# Patient Record
Sex: FEMALE | Race: BLACK OR AFRICAN AMERICAN | NOT HISPANIC OR LATINO | Employment: FULL TIME | URBAN - METROPOLITAN AREA
[De-identification: names, ages, dates, MRNs, and addresses within clinical notes are randomized per-mention and may not be internally consistent; named-entity substitution may affect disease eponyms.]

---

## 2018-10-30 ENCOUNTER — APPOINTMENT (OUTPATIENT)
Dept: RADIOLOGY | Facility: MEDICAL CENTER | Age: 33
End: 2018-10-30
Attending: EMERGENCY MEDICINE
Payer: COMMERCIAL

## 2018-10-30 ENCOUNTER — HOSPITAL ENCOUNTER (EMERGENCY)
Facility: MEDICAL CENTER | Age: 33
End: 2018-10-30
Attending: EMERGENCY MEDICINE
Payer: COMMERCIAL

## 2018-10-30 VITALS
DIASTOLIC BLOOD PRESSURE: 85 MMHG | BODY MASS INDEX: 36.8 KG/M2 | OXYGEN SATURATION: 96 % | TEMPERATURE: 97.2 F | HEIGHT: 63 IN | WEIGHT: 207.67 LBS | SYSTOLIC BLOOD PRESSURE: 140 MMHG | RESPIRATION RATE: 18 BRPM | HEART RATE: 86 BPM

## 2018-10-30 DIAGNOSIS — N83.202 CYST OF LEFT OVARY: ICD-10-CM

## 2018-10-30 DIAGNOSIS — N92.1 METRORRHAGIA: ICD-10-CM

## 2018-10-30 LAB
ALBUMIN SERPL BCP-MCNC: 4.5 G/DL (ref 3.2–4.9)
ALBUMIN/GLOB SERPL: 1.1 G/DL
ALP SERPL-CCNC: 88 U/L (ref 30–99)
ALT SERPL-CCNC: 23 U/L (ref 2–50)
ANION GAP SERPL CALC-SCNC: 13 MMOL/L (ref 0–11.9)
APPEARANCE UR: ABNORMAL
AST SERPL-CCNC: 26 U/L (ref 12–45)
BACTERIA #/AREA URNS HPF: ABNORMAL /HPF
BASOPHILS # BLD AUTO: 0.4 % (ref 0–1.8)
BASOPHILS # BLD: 0.03 K/UL (ref 0–0.12)
BILIRUB SERPL-MCNC: 0.6 MG/DL (ref 0.1–1.5)
BILIRUB UR QL STRIP.AUTO: NEGATIVE
BUN SERPL-MCNC: 12 MG/DL (ref 8–22)
CALCIUM SERPL-MCNC: 10.1 MG/DL (ref 8.5–10.5)
CHLORIDE SERPL-SCNC: 104 MMOL/L (ref 96–112)
CO2 SERPL-SCNC: 24 MMOL/L (ref 20–33)
COLOR UR: YELLOW
CREAT SERPL-MCNC: 0.86 MG/DL (ref 0.5–1.4)
EOSINOPHIL # BLD AUTO: 0.03 K/UL (ref 0–0.51)
EOSINOPHIL NFR BLD: 0.4 % (ref 0–6.9)
EPI CELLS #/AREA URNS HPF: ABNORMAL /HPF
ERYTHROCYTE [DISTWIDTH] IN BLOOD BY AUTOMATED COUNT: 45.6 FL (ref 35.9–50)
GLOBULIN SER CALC-MCNC: 4.2 G/DL (ref 1.9–3.5)
GLUCOSE SERPL-MCNC: 86 MG/DL (ref 65–99)
GLUCOSE UR STRIP.AUTO-MCNC: NEGATIVE MG/DL
HCG SERPL QL: NEGATIVE
HCT VFR BLD AUTO: 40.8 % (ref 37–47)
HGB BLD-MCNC: 13.9 G/DL (ref 12–16)
HYALINE CASTS #/AREA URNS LPF: ABNORMAL /LPF
IMM GRANULOCYTES # BLD AUTO: 0.02 K/UL (ref 0–0.11)
IMM GRANULOCYTES NFR BLD AUTO: 0.3 % (ref 0–0.9)
KETONES UR STRIP.AUTO-MCNC: 40 MG/DL
LEUKOCYTE ESTERASE UR QL STRIP.AUTO: NEGATIVE
LIPASE SERPL-CCNC: 9 U/L (ref 11–82)
LYMPHOCYTES # BLD AUTO: 2.21 K/UL (ref 1–4.8)
LYMPHOCYTES NFR BLD: 30.7 % (ref 22–41)
MCH RBC QN AUTO: 31.2 PG (ref 27–33)
MCHC RBC AUTO-ENTMCNC: 34.1 G/DL (ref 33.6–35)
MCV RBC AUTO: 91.7 FL (ref 81.4–97.8)
MICRO URNS: ABNORMAL
MONOCYTES # BLD AUTO: 0.39 K/UL (ref 0–0.85)
MONOCYTES NFR BLD AUTO: 5.4 % (ref 0–13.4)
NEUTROPHILS # BLD AUTO: 4.51 K/UL (ref 2–7.15)
NEUTROPHILS NFR BLD: 62.8 % (ref 44–72)
NITRITE UR QL STRIP.AUTO: POSITIVE
NRBC # BLD AUTO: 0 K/UL
NRBC BLD-RTO: 0 /100 WBC
PH UR STRIP.AUTO: 5 [PH]
PLATELET # BLD AUTO: 277 K/UL (ref 164–446)
PMV BLD AUTO: 9.4 FL (ref 9–12.9)
POTASSIUM SERPL-SCNC: 4.2 MMOL/L (ref 3.6–5.5)
PROT SERPL-MCNC: 8.7 G/DL (ref 6–8.2)
PROT UR QL STRIP: NEGATIVE MG/DL
RBC # BLD AUTO: 4.45 M/UL (ref 4.2–5.4)
RBC # URNS HPF: ABNORMAL /HPF
RBC UR QL AUTO: ABNORMAL
RENAL EPI CELLS #/AREA URNS HPF: ABNORMAL /HPF
SODIUM SERPL-SCNC: 141 MMOL/L (ref 135–145)
SP GR UR STRIP.AUTO: 1.02
UROBILINOGEN UR STRIP.AUTO-MCNC: 0.2 MG/DL
WBC # BLD AUTO: 7.2 K/UL (ref 4.8–10.8)
WBC #/AREA URNS HPF: ABNORMAL /HPF

## 2018-10-30 PROCEDURE — 76856 US EXAM PELVIC COMPLETE: CPT

## 2018-10-30 PROCEDURE — 700111 HCHG RX REV CODE 636 W/ 250 OVERRIDE (IP): Performed by: EMERGENCY MEDICINE

## 2018-10-30 PROCEDURE — 84703 CHORIONIC GONADOTROPIN ASSAY: CPT

## 2018-10-30 PROCEDURE — A9270 NON-COVERED ITEM OR SERVICE: HCPCS | Performed by: EMERGENCY MEDICINE

## 2018-10-30 PROCEDURE — 96375 TX/PRO/DX INJ NEW DRUG ADDON: CPT

## 2018-10-30 PROCEDURE — 81001 URINALYSIS AUTO W/SCOPE: CPT

## 2018-10-30 PROCEDURE — 85025 COMPLETE CBC W/AUTO DIFF WBC: CPT

## 2018-10-30 PROCEDURE — 700102 HCHG RX REV CODE 250 W/ 637 OVERRIDE(OP): Performed by: EMERGENCY MEDICINE

## 2018-10-30 PROCEDURE — 99285 EMERGENCY DEPT VISIT HI MDM: CPT

## 2018-10-30 PROCEDURE — 83690 ASSAY OF LIPASE: CPT

## 2018-10-30 PROCEDURE — 700105 HCHG RX REV CODE 258: Performed by: EMERGENCY MEDICINE

## 2018-10-30 PROCEDURE — 96374 THER/PROPH/DIAG INJ IV PUSH: CPT

## 2018-10-30 PROCEDURE — 80053 COMPREHEN METABOLIC PANEL: CPT

## 2018-10-30 RX ORDER — ACETAMINOPHEN 325 MG/1
650 TABLET ORAL ONCE
Status: COMPLETED | OUTPATIENT
Start: 2018-10-30 | End: 2018-10-30

## 2018-10-30 RX ORDER — SODIUM CHLORIDE 9 MG/ML
1000 INJECTION, SOLUTION INTRAVENOUS ONCE
Status: COMPLETED | OUTPATIENT
Start: 2018-10-30 | End: 2018-10-30

## 2018-10-30 RX ORDER — METOCLOPRAMIDE HYDROCHLORIDE 5 MG/ML
10 INJECTION INTRAMUSCULAR; INTRAVENOUS ONCE
Status: COMPLETED | OUTPATIENT
Start: 2018-10-30 | End: 2018-10-30

## 2018-10-30 RX ORDER — ONDANSETRON 2 MG/ML
4 INJECTION INTRAMUSCULAR; INTRAVENOUS ONCE
Status: COMPLETED | OUTPATIENT
Start: 2018-10-30 | End: 2018-10-30

## 2018-10-30 RX ADMIN — METOCLOPRAMIDE 10 MG: 5 INJECTION, SOLUTION INTRAMUSCULAR; INTRAVENOUS at 19:59

## 2018-10-30 RX ADMIN — SODIUM CHLORIDE 1000 ML: 9 INJECTION, SOLUTION INTRAVENOUS at 19:58

## 2018-10-30 RX ADMIN — ONDANSETRON 4 MG: 2 INJECTION INTRAMUSCULAR; INTRAVENOUS at 19:59

## 2018-10-30 RX ADMIN — ACETAMINOPHEN 650 MG: 325 TABLET, FILM COATED ORAL at 19:58

## 2018-10-30 ASSESSMENT — PAIN SCALES - GENERAL
PAINLEVEL_OUTOF10: 7
PAINLEVEL_OUTOF10: 7
PAINLEVEL_OUTOF10: 3

## 2018-10-31 NOTE — ED TRIAGE NOTES
Pt ambulatory to triage. Pt states she has generalized abdominal pain that started began this morning. LBM was last night. LMP 10/16. Pt denies painful urination or frequency.      Chief Complaint   Patient presents with   • Abdominal Pain     Pt placed in lobby, updated on triage process. Pt educated to notified RN or triage tech if changes in condition.

## 2018-10-31 NOTE — ED NOTES
RN went through discharge paperwork with the pt. And her significant other. The pt. Was very receptive to instructions and follow up care.

## 2018-10-31 NOTE — ED PROVIDER NOTES
ED Provider Note    Scribed for Irvin Jc M.D. by Ammon Win. 10/30/2018, 7:20 PM.    Primary care provider: None noted  Means of arrival: Walk in  History obtained from: Patient  History limited by: None    CHIEF COMPLAINT  Chief Complaint   Patient presents with   • Abdominal Pain       HPI  Esha Carson is a 33 y.o. female with history of A7, asthma, and migraines who presents to the Emergency Department for right lower quadrant pain that radiates around to her right lower back onset this morning after sexual intercourse. The patient states the sexual intercourse was not painful at that time. She endorses associated vaginal bleeding, which is reported to be as heavy as a normal menstrual period, but is concerned because she just finished her last menstrual period a few days ago. The patient also state she has developed vomiting, no hematemesis, with about 5 episodes of emesis today. She currently has a headache, but states this feels similar to her past migraines. The patient additionally has been experiencing generalized body aches. She denies diarrhea or dysuria. No alleviating or exacerbating factors are identified at this time.     REVIEW OF SYSTEMS  Pertinent positives include right lower quadrant pain, vaginal bleeding, vomiting, headache, and generalized body aches. Pertinent negatives include hematemesis, diarrhea, or dysuria. All other systems negative.    PAST MEDICAL HISTORY   A7    SURGICAL HISTORY  patient denies any surgical history    SOCIAL HISTORY  Social History   Substance Use Topics   • Smoking status: Current Every Day Smoker     Packs/day: 0.25   • Smokeless tobacco: Never Used   • Alcohol use Yes      Comment: occ      History   Drug Use No       FAMILY HISTORY  History reviewed. No pertinent family history.    CURRENT MEDICATIONS  Home Medications     Reviewed by Nishant Llanos R.N. (Registered Nurse) on 10/30/18 at 1801  Med List Status: Complete  "  Medication Last Dose Status        Patient Gaston Taking any Medications                       ALLERGIES  Allergies   Allergen Reactions   • Ibuprofen        PHYSICAL EXAM  VITAL SIGNS: /92   Pulse 95   Temp 36.2 °C (97.2 °F) (Temporal)   Resp 18   Ht 1.6 m (5' 3\")   Wt 94.2 kg (207 lb 10.8 oz)   SpO2 94%   BMI 36.79 kg/m²     Constitutional: Well developed, Well nourished, No distress.   HENT: Normocephalic, Atraumatic, Oropharynx moist.   Eyes: Conjunctiva normal, No discharge.   Neck: Supple, No stridor.  Cardiovascular: Normal heart rate, Normal rhythm, No murmurs, equal pulses.   Pulmonary: Normal breath sounds, No respiratory distress, No wheezing, No rales, No rhonchi.  Chest: No chest wall tenderness or deformity.   Abdomen:Soft, Suprapubic and right lower quadrant tenderness, No masses, no rebound, no guarding.   Pelvic: Small amount of dark blood in vaginal vault. No vaginal laceration. Bleeding appears to be coming from cervical os. Tenderness in left adnexal.   Back: Slight right sided CVA tenderness.   Musculoskeletal: No major deformities noted, No tenderness.   Skin: Warm, Dry, No erythema, No rash.   Neurologic: Alert & oriented x 3, Normal motor function,  No focal deficits noted.   Psychiatric: Affect normal, Judgment normal, Mood normal.     LABS  Results for orders placed or performed during the hospital encounter of 10/30/18   CBC WITH DIFFERENTIAL   Result Value Ref Range    WBC 7.2 4.8 - 10.8 K/uL    RBC 4.45 4.20 - 5.40 M/uL    Hemoglobin 13.9 12.0 - 16.0 g/dL    Hematocrit 40.8 37.0 - 47.0 %    MCV 91.7 81.4 - 97.8 fL    MCH 31.2 27.0 - 33.0 pg    MCHC 34.1 33.6 - 35.0 g/dL    RDW 45.6 35.9 - 50.0 fL    Platelet Count 277 164 - 446 K/uL    MPV 9.4 9.0 - 12.9 fL    Neutrophils-Polys 62.80 44.00 - 72.00 %    Lymphocytes 30.70 22.00 - 41.00 %    Monocytes 5.40 0.00 - 13.40 %    Eosinophils 0.40 0.00 - 6.90 %    Basophils 0.40 0.00 - 1.80 %    Immature Granulocytes 0.30 0.00 - 0.90 " %    Nucleated RBC 0.00 /100 WBC    Neutrophils (Absolute) 4.51 2.00 - 7.15 K/uL    Lymphs (Absolute) 2.21 1.00 - 4.80 K/uL    Monos (Absolute) 0.39 0.00 - 0.85 K/uL    Eos (Absolute) 0.03 0.00 - 0.51 K/uL    Baso (Absolute) 0.03 0.00 - 0.12 K/uL    Immature Granulocytes (abs) 0.02 0.00 - 0.11 K/uL    NRBC (Absolute) 0.00 K/uL   COMP METABOLIC PANEL   Result Value Ref Range    Sodium 141 135 - 145 mmol/L    Potassium 4.2 3.6 - 5.5 mmol/L    Chloride 104 96 - 112 mmol/L    Co2 24 20 - 33 mmol/L    Anion Gap 13.0 (H) 0.0 - 11.9    Glucose 86 65 - 99 mg/dL    Bun 12 8 - 22 mg/dL    Creatinine 0.86 0.50 - 1.40 mg/dL    Calcium 10.1 8.5 - 10.5 mg/dL    AST(SGOT) 26 12 - 45 U/L    ALT(SGPT) 23 2 - 50 U/L    Alkaline Phosphatase 88 30 - 99 U/L    Total Bilirubin 0.6 0.1 - 1.5 mg/dL    Albumin 4.5 3.2 - 4.9 g/dL    Total Protein 8.7 (H) 6.0 - 8.2 g/dL    Globulin 4.2 (H) 1.9 - 3.5 g/dL    A-G Ratio 1.1 g/dL   LIPASE   Result Value Ref Range    Lipase 9 (L) 11 - 82 U/L   HCG QUAL SERUM   Result Value Ref Range    Beta-Hcg Qualitative Serum Negative Negative   URINALYSIS,CULTURE IF INDICATED   Result Value Ref Range    Color Yellow     Character Cloudy (A)     Specific Gravity 1.022 <1.035    Ph 5.0 5.0 - 8.0    Glucose Negative Negative mg/dL    Ketones 40 (A) Negative mg/dL    Protein Negative Negative mg/dL    Bilirubin Negative Negative    Urobilinogen, Urine 0.2 Negative    Nitrite Positive (A) Negative    Leukocyte Esterase Negative Negative    Occult Blood Large (A) Negative    Micro Urine Req Microscopic    URINE MICROSCOPIC (W/UA)   Result Value Ref Range    WBC 2-5 /hpf    RBC 0-2 /hpf    Bacteria Many (A) None /hpf    Epithelial Cells Few /hpf    Epithelial Cells Renal Few /hpf    Hyaline Cast 0-2 /lpf   ESTIMATED GFR   Result Value Ref Range    GFR If African American >60 >60 mL/min/1.73 m 2    GFR If Non African American >60 >60 mL/min/1.73 m 2     All labs reviewed by me.    RADIOLOGY  US-PELVIC COMPLETE  (TRANSABDOMINAL/TRANSVAGINAL) (COMBO)   Final Result         1.  Mildly complex left ovarian cyst. Recommend follow-up pelvic sonogram in 6 weeks for reevaluation.   2.  Doppler flow is not definitively identified within the right ovary, ovarian torsion cannot be sonographically excluded. However the ovary would most commonly be enlarged in the setting of torsion.   3.  Nabothian cyst        The radiologist's interpretation of all radiological studies have been reviewed by me.    COURSE & MEDICAL DECISION MAKING  Pertinent Labs & Imaging studies reviewed. (See chart for details)    7:20 PM - Patient seen and examined at bedside. Patient will be treated with Tylenol 650 mg, Zofran 4 mg, and Reglan 10 mg. The patient will receive 1 L IV fluids for concern of dehydration given vomiting today. Ordered Chlamydia & GC, Wet Prep, Urine Microscopic, Estimated GFR, Urinalysis, HCG Qual, Lipase, CMP, and CBC with differential to evaluate her symptoms. The differential diagnoses include but are not limited to: ovarian cyst, ectopic pregnancy, UTI, vaginal laceration, gastroenteritis.      8:45 PM - Reviewed patient's lab and radiology results as shown above.     8:47 PM - Pelvic exam performed at this time in presence of female RN chaperone. Patient does not wish to have cultures ordered at this time. Cancelled Chlamydia & GC. Ordered US-Pelvic Transvaginal to further evaluate.    10:55 PM - Reviewed patient's radiology results as shown above.    10:57 PM - Patient reevaluated at bedside. She reports to be feeling better after IV fluids, thus having positive reaction. Patient updated with imaging results that indicate a mild complex ovarian cyst. She is recommended to follow up with Gynecologist. Patient will be discharged. He is understanding and agreeable.     Medical Decision Making: At this point time I think the patient most likely has vaginal bleeding from her ovarian cyst.  She did not have any pain on pelvic exam on  the right side I do not think she has ovarian torsion.  Patient has a OB/GYN I will have her follow-up with them.  Discussed that she needs a repeat ultrasound in 6 weeks.  Patient's abdomen is otherwise nontender she has not had any further vomiting here.  Patient will be discharged home.  On reexam she has no pain or tenderness in the right lower quadrant I do not think this is early appendicitis.    The patient will return for new or worsening symptoms and is stable at the time of discharge.    The patient is referred to a primary physician for blood pressure management, diabetic screening, and for all other preventative health concerns.    DISPOSITION:  Patient will be discharged home in stable condition.    FOLLOW UP:  Your doctor    Schedule an appointment as soon as possible for a visit in 1 week  To follow up your ovarian cyst      OUTPATIENT MEDICATIONS:  There are no discharge medications for this patient.       FINAL IMPRESSION  1. Metrorrhagia    2. Cyst of left ovary          Ammon ABDALLA (Dashawn), am scribing for, and in the presence of, Irvin Jc M.D.    Electronically signed by: Ammon Reddy), 10/30/2018    Irvin ABDALLA M.D. personally performed the services described in this documentation, as scribed by Ammon Win in my presence, and it is both accurate and complete.    C.    The note accurately reflects work and decisions made by me.  Irvin Jc  10/30/2018  11:45 PM

## 2018-10-31 NOTE — DISCHARGE INSTRUCTIONS
Return if you have increasing pain, heavy vaginal bleeding greater than a pad an hour, fever or pass out.

## 2019-10-02 ENCOUNTER — HOSPITAL ENCOUNTER (EMERGENCY)
Facility: MEDICAL CENTER | Age: 34
End: 2019-10-02
Attending: EMERGENCY MEDICINE
Payer: COMMERCIAL

## 2019-10-02 VITALS
TEMPERATURE: 96.8 F | BODY MASS INDEX: 35.79 KG/M2 | HEIGHT: 66 IN | HEART RATE: 103 BPM | WEIGHT: 222.66 LBS | OXYGEN SATURATION: 98 % | SYSTOLIC BLOOD PRESSURE: 139 MMHG | DIASTOLIC BLOOD PRESSURE: 104 MMHG | RESPIRATION RATE: 18 BRPM

## 2019-10-02 DIAGNOSIS — B86 SCABIES: ICD-10-CM

## 2019-10-02 DIAGNOSIS — R21 RASH: ICD-10-CM

## 2019-10-02 PROCEDURE — 99283 EMERGENCY DEPT VISIT LOW MDM: CPT

## 2019-10-02 RX ORDER — PERMETHRIN 50 MG/G
1 CREAM TOPICAL ONCE
Qty: 1 TUBE | Refills: 1 | Status: SHIPPED | OUTPATIENT
Start: 2019-10-02 | End: 2019-10-02

## 2019-10-02 ASSESSMENT — LIFESTYLE VARIABLES: DO YOU DRINK ALCOHOL: NO

## 2019-10-02 NOTE — ED TRIAGE NOTES
Chief Complaint   Patient presents with   • Rash     States people she was staying with had scabies and now complains of skin irritation.     Ambulatory to triage for above. Explained triage process, to waiting room. Asked to inform RN if questions or concerns arise.

## 2019-10-02 NOTE — ED PROVIDER NOTES
"ED Provider Note    Scribed for Braulio Greene M.D. by Mercy Mclaughlin. 10/2/2019, 1:17 PM.    Primary care provider: None noted  Means of arrival: Walk-in  History obtained from: Patient  History limited by: None    CHIEF COMPLAINT  Chief Complaint   Patient presents with   • Rash     States people she was staying with had scabies and now complains of skin irritation.       HPI  Esha Carson is a 34 y.o. female smoker who presents to the Emergency Department for complaints of rash in multiple locations. She states that she is worried about scabies due to her ex-boyfriend having it. She notes she had a lot of contact with him. She denies any prior medical history. 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 She states she smokes it, and has never shot it. Negative for fever, chills, or shortness of breath.    REVIEW OF SYSTEMS  Pertinent positives include rash.   Pertinent negatives include no fever, chills, or shortness of breath.  No fevers or chills.  GI: No nausea or vomiting      PAST MEDICAL HISTORY  No past pertinent medical history.    SURGICAL HISTORY   has a past surgical history that includes ankle orif.    SOCIAL HISTORY  Social History     Tobacco Use   • Smoking status: Current Every Day Smoker     Packs/day: 0.25   • Smokeless tobacco: Never Used   Substance Use Topics   • Alcohol use: Yes     Comment: occ   • Drug use: No      Social History     Substance and Sexual Activity   Drug Use No       FAMILY HISTORY  None pertinent    CURRENT MEDICATIONS  Home Medications     Reviewed by Irvin Gonzalez R.N. (Registered Nurse) on 10/02/19 at 1327  Med List Status: Complete   Medication Last Dose Status        Patient Gaston Taking any Medications                       ALLERGIES  Allergies   Allergen Reactions   • Ibuprofen        PHYSICAL EXAM  VITAL SIGNS: /104   Pulse (!) 103   Temp 36 °C (96.8 °F) (Temporal)   Resp 18   Ht 1.676 m (5' 6\")   Wt 101 kg (222 lb 10.6 oz)   " SpO2 98%   BMI 35.94 kg/m²   Vitals reviewed.  Constitutional: Well developed, Well nourished, No acute distress, Non-toxic appearance.   HENT: Normocephalic, Atraumatic, Bilateral external ears normal, Oropharynx moist, No oral exudates, Nose normal.   Cardiovascular: Normal heart rate, Normal rhythm, No murmurs, No rubs, No gallops.   Thorax & Lungs: Normal breath sounds, No respiratory distress, No wheezing,  Abdomen: Bowel sounds normal, Soft, No tenderness  Skin: Warm, Dry, No erythema, No rash.  Diffuse areas of excoriation, some of these are under the breasts, and on the upper extremities.  These are concerning for scabies.  No signs of embolic phenomenon.  Musculoskeletal: Good range of motion in all major joints.   Neurologic: Alert, No focal deficits noted.   Psychiatric: Affect normal      COURSE & MEDICAL DECISION MAKING  Pertinent Labs & Imaging studies reviewed. (See chart for details)    1:17 PM - Patient seen and examined at bedside. The patient presents with rash, and the differential diagnosis includes but is not limited to scabies, methamphetamine induced formication.  I informed patient that I will treat her for scabies. I informed her of plan for discharge with Elimite 5% cream. Patient understands and agrees to plan.    Patient has a diffuse rash.  Looks like a small excoriated and looks to be of various ages.  Likely it scabietic.  This could be meth induced formication.    In either case the patient was treated for scabies and given follow-up.  She is comfortable to plan.  She will return for worsening rash or other concerns she is discharged in good condition.     The patient will return for new or worsening symptoms and is stable at the time of discharge.    DISPOSITION:  Patient will be discharged home in stable condition.    FOLLOW UP:  [address] [id number] [phone]  Schedule an appointment as soon as possible for a visit in 2  days        OUTPATIENT MEDICATIONS:  New Prescriptions    PERMETHRIN (ELIMITE) 5 % CREAM    Apply 1 Tube to affected area(s) Once for 1 dose.        FINAL IMPRESSION  1. Rash    2. Scabies          Mercy ABDALLA (Scribe), am scribing for, and in the presence of, Braulio Greene M.D..    Electronically signed by: Mercy Mclaughlin (Scribe), 10/2/2019    Braulio ABDALLA M.D. personally performed the services described in this documentation, as scribed by Mercy Mclaughlin in my presence, and it is both accurate and complete. E    The note accurately reflects work and decisions made by me.  Braulio Greene  10/2/2019  3:09 PM

## 2019-10-02 NOTE — ED NOTES
Esha Nichols Brittany Carson discharged via ambulatory with steady gait.  Discharge instructions given and reviewed, patient educated to follow up with PCP, verbalized understanding.  Prescriptions given x 1.  All personal belongings in possession.  No questions at this time.

## 2019-10-02 NOTE — DISCHARGE INSTRUCTIONS
Medications as prescribed.  Follow-up with primary care.  Return for more rash or other concerns per

## 2019-10-15 ENCOUNTER — HOSPITAL ENCOUNTER (EMERGENCY)
Facility: MEDICAL CENTER | Age: 34
End: 2019-10-15
Attending: EMERGENCY MEDICINE
Payer: COMMERCIAL

## 2019-10-15 VITALS
HEART RATE: 88 BPM | OXYGEN SATURATION: 100 % | TEMPERATURE: 97.9 F | DIASTOLIC BLOOD PRESSURE: 75 MMHG | HEIGHT: 63 IN | WEIGHT: 169.97 LBS | RESPIRATION RATE: 16 BRPM | SYSTOLIC BLOOD PRESSURE: 144 MMHG | BODY MASS INDEX: 30.12 KG/M2

## 2019-10-15 DIAGNOSIS — J45.21 MILD INTERMITTENT ASTHMA WITH ACUTE EXACERBATION: ICD-10-CM

## 2019-10-15 DIAGNOSIS — J06.9 VIRAL UPPER RESPIRATORY TRACT INFECTION: ICD-10-CM

## 2019-10-15 PROCEDURE — 99283 EMERGENCY DEPT VISIT LOW MDM: CPT

## 2019-10-15 RX ORDER — PERMETHRIN 50 MG/G
CREAM TOPICAL
Qty: 1 TUBE | Refills: 1 | Status: SHIPPED | OUTPATIENT
Start: 2019-10-15

## 2019-10-15 RX ORDER — PREDNISONE 20 MG/1
40 TABLET ORAL DAILY
Qty: 10 TAB | Refills: 0 | Status: SHIPPED | OUTPATIENT
Start: 2019-10-15 | End: 2019-10-20

## 2019-10-15 RX ORDER — ALBUTEROL SULFATE 90 UG/1
2 AEROSOL, METERED RESPIRATORY (INHALATION) EVERY 6 HOURS PRN
Qty: 8.5 G | Refills: 0 | Status: SHIPPED | OUTPATIENT
Start: 2019-10-15

## 2019-10-15 NOTE — ED NOTES
Pt was ambulatory to room, changed to gown and placed on monitor.   Pt also c/o itching all over began Saturday.

## 2019-10-15 NOTE — ED NOTES
Pt discharged with s/o. To follow up with pcp as needed. Scheduling to bedside to assist pt with finding pcp.  Pt given prescription x 3 with indication

## 2019-10-15 NOTE — ED TRIAGE NOTES
Ambulatory to triage with   Chief Complaint   Patient presents with   • Asthma   States hx of asthma. Does not use prescribed inhalers, states she does not have them. States she feels like she is getting a cold. C/o slight SOB and tight breathing today at work. No apparent respiratory distress. VSS.

## 2019-10-15 NOTE — ED PROVIDER NOTES
ED Provider Note    CHIEF COMPLAINT  Chief Complaint   Patient presents with   • Asthma       HPI  Terrelllisa Magdalena Carson is a 34 y.o. female who presents with a cough and congestion.  The patient had the symptoms for a few days.  She started off with a headache and just not feeling well few days ago.  During history she has had increasing cough, congestion.  She was quite short of breath today.  She is a history of asthma, and whenever she gets a cold this does seem to exacerbate things.  She does not have a local primary doctor having just recently moved here.  Denies any earache.  Little bit of a sore throat.  No chest pain.  No belly pain.  Little bit of loose stools which is started today.  No leg pain or swelling.  No rashes.  However she is concerned about the fact that 2 people in her office have had scabies.  She would like to have some cream for this.  There is no other complaint.    PAST MEDICAL HISTORY  None    FAMILY HISTORY  History reviewed. No pertinent family history.    SOCIAL HISTORY  Social History     Tobacco Use   • Smoking status: Current Every Day Smoker     Packs/day: 0.25     Types: Cigarettes   • Smokeless tobacco: Never Used   Substance Use Topics   • Alcohol use: Not Currently     Comment: occ   • Drug use: No     Here with her fiancé    SURGICAL HISTORY  Past Surgical History:   Procedure Laterality Date   • ANKLE ORIF         CURRENT MEDICATIONS  Home Medications     Reviewed by Paulina Reyes R.N. (Registered Nurse) on 10/15/19 at 0946  Med List Status: Complete   Medication Last Dose Status        Patient Gaston Taking any Medications                       I have reviewed the nurses notes and/or the list brought with the patient.    ALLERGIES  Allergies   Allergen Reactions   • Ibuprofen        REVIEW OF SYSTEMS  See HPI for further details. Review of systems as above, otherwise all other systems are negative.     PHYSICAL EXAM  VITAL SIGNS: /97   Pulse 88   Temp 36.7  "°C (98.1 °F) (Temporal)   Resp 18   Ht 1.6 m (5' 3\")   Wt 77.1 kg (169 lb 15.6 oz)   LMP 10/07/2019   SpO2 100%   BMI 30.11 kg/m²     Constitutional: Well appearing patient in no acute distress.  Not toxic, nor ill in appearance.  Mild upper history congestion.  HENT: Mucus membranes moist.  Oropharynx is clear.  Eyes: Pupils equally round.  No scleral icterus.   Neck: Full nontender range of motion.  Lymphatic: No cervical lymphadenopathy noted.   Cardiovascular: Regular heart rate and rhythm.  No murmurs, rubs, nor gallop appreciated.   Thorax & Lungs: Chest is nontender.  Lungs are notable for the faintness of wheeze at end expiration.  Good air movement.  Abdomen: Soft, with no tenderness, rebound nor guarding.  No mass, pulsatile mass, nor hepatosplenomegaly appreciated.  Skin: No purpura nor petechia noted.  Extremities/Musculoskeletal: No sign of trauma.    Neurologic: Alert & oriented.  Strength and sensation is intact all around.  Gait is normal.  Psychiatric: Normal affect appropriate for the clinical situation.    MEDICAL RECORD  I have reviewed patient's medical record and pertinent results are listed above.    COURSE & MEDICAL DECISION MAKING  I have reviewed any medical record information, laboratory studies and radiographic results as noted above.  This is a well-appearing patient with what appears to be a viral upper respiratory infection.  Clinically there is no evidence of meningitis, pneumonia.  She is well-hydrated.  She has a history of asthma and she has the pain is a wheeze presently.  I recommend albuterol.  She points that she is been on prednisone for symptoms like this in the past.  Having a prescription for this as well.  I have asked her to follow-up with the primary care doctor, and have asked  to talk with her about setting her up as a new patient.  Patient is concerned about scabies, clinically is no evidence of this, however at this point I have gone ahead and given " her a prescription for Elimite to that she may use.  Stresses about asthma, URI, and scabies.      FINAL IMPRESSION  1. Viral upper respiratory tract infection    2. Mild intermittent asthma with acute exacerbation    3.  Exposure to scabies       This dictation was created using voice recognition software.    Electronically signed by: Edwin Rose, 10/15/2019 11:05 AM

## 2019-10-31 ENCOUNTER — HOSPITAL ENCOUNTER (EMERGENCY)
Facility: MEDICAL CENTER | Age: 34
End: 2019-10-31
Attending: EMERGENCY MEDICINE
Payer: COMMERCIAL

## 2019-10-31 VITALS
HEIGHT: 63 IN | OXYGEN SATURATION: 99 % | RESPIRATION RATE: 14 BRPM | WEIGHT: 219.8 LBS | TEMPERATURE: 98.6 F | SYSTOLIC BLOOD PRESSURE: 112 MMHG | BODY MASS INDEX: 38.95 KG/M2 | DIASTOLIC BLOOD PRESSURE: 63 MMHG | HEART RATE: 85 BPM

## 2019-10-31 DIAGNOSIS — J02.0 STREP PHARYNGITIS: ICD-10-CM

## 2019-10-31 LAB — S PYO DNA SPEC NAA+PROBE: DETECTED

## 2019-10-31 PROCEDURE — 87651 STREP A DNA AMP PROBE: CPT

## 2019-10-31 PROCEDURE — 700102 HCHG RX REV CODE 250 W/ 637 OVERRIDE(OP): Performed by: EMERGENCY MEDICINE

## 2019-10-31 PROCEDURE — 99284 EMERGENCY DEPT VISIT MOD MDM: CPT

## 2019-10-31 PROCEDURE — A9270 NON-COVERED ITEM OR SERVICE: HCPCS | Performed by: EMERGENCY MEDICINE

## 2019-10-31 RX ORDER — AMOXICILLIN 500 MG/1
500 CAPSULE ORAL ONCE
Status: COMPLETED | OUTPATIENT
Start: 2019-10-31 | End: 2019-10-31

## 2019-10-31 RX ORDER — AMOXICILLIN 500 MG/1
500 CAPSULE ORAL 3 TIMES DAILY
Qty: 30 CAP | Refills: 0 | Status: SHIPPED | OUTPATIENT
Start: 2019-10-31 | End: 2019-11-10

## 2019-10-31 RX ORDER — ACETAMINOPHEN 325 MG/1
650 TABLET ORAL ONCE
Status: COMPLETED | OUTPATIENT
Start: 2019-10-31 | End: 2019-10-31

## 2019-10-31 RX ADMIN — AMOXICILLIN 500 MG: 500 CAPSULE ORAL at 09:23

## 2019-10-31 RX ADMIN — ACETAMINOPHEN 650 MG: 325 TABLET, FILM COATED ORAL at 09:23

## 2019-10-31 ASSESSMENT — LIFESTYLE VARIABLES
HAVE YOU EVER FELT YOU SHOULD CUT DOWN ON YOUR DRINKING: NO
EVER HAD A DRINK FIRST THING IN THE MORNING TO STEADY YOUR NERVES TO GET RID OF A HANGOVER: NO
EVER FELT BAD OR GUILTY ABOUT YOUR DRINKING: NO
DO YOU DRINK ALCOHOL: YES
DOES PATIENT WANT TO STOP DRINKING: NO
CONSUMPTION TOTAL: INCOMPLETE
TOTAL SCORE: 0
TOTAL SCORE: 0
HAVE PEOPLE ANNOYED YOU BY CRITICIZING YOUR DRINKING: NO
TOTAL SCORE: 0

## 2019-10-31 NOTE — ED PROVIDER NOTES
ED Provider Note    CHIEF COMPLAINT  Chief Complaint   Patient presents with   • Sore Throat     x 3 days       HPI  Esha Magdalena Carson is a 34 y.o. female who presents to the emergency department complaining of a sore throat.  Patient had a sore throat for last 3 days.  Her with a runny nose and some congestion.  Sore throat is gradually worsening.  Hard time eating which she will drink fluids.  No difficulty breathing.  She has minimal associated cough.  She had fevers and achiness.  No abdominal pain or vomiting.  Denies any other aggravating leaving factors or associated complaints.  Has a history of asthma but no difficulty breathing    REVIEW OF SYSTEMS  See HPI for further details. All other systems are negative.    PAST MEDICAL HISTORY  History reviewed. No pertinent past medical history.    FAMILY HISTORY  No family history on file.    SOCIAL HISTORY  Social History     Socioeconomic History   • Marital status: Single     Spouse name: Not on file   • Number of children: Not on file   • Years of education: Not on file   • Highest education level: Not on file   Occupational History   • Not on file   Social Needs   • Financial resource strain: Not on file   • Food insecurity:     Worry: Not on file     Inability: Not on file   • Transportation needs:     Medical: Not on file     Non-medical: Not on file   Tobacco Use   • Smoking status: Current Every Day Smoker     Packs/day: 0.25     Types: Cigarettes   • Smokeless tobacco: Never Used   Substance and Sexual Activity   • Alcohol use: Not Currently     Comment: occ   • Drug use: No   • Sexual activity: Not on file   Lifestyle   • Physical activity:     Days per week: Not on file     Minutes per session: Not on file   • Stress: Not on file   Relationships   • Social connections:     Talks on phone: Not on file     Gets together: Not on file     Attends Orthodox service: Not on file     Active member of club or organization: Not on file     Attends  "meetings of clubs or organizations: Not on file     Relationship status: Not on file   • Intimate partner violence:     Fear of current or ex partner: Not on file     Emotionally abused: Not on file     Physically abused: Not on file     Forced sexual activity: Not on file   Other Topics Concern   • Not on file   Social History Narrative   • Not on file       SURGICAL HISTORY  Past Surgical History:   Procedure Laterality Date   • ANKLE ORIF         CURRENT MEDICATIONS  Home Medications     Reviewed by Tay Nelson R.N. (Registered Nurse) on 10/31/19 at 0836  Med List Status: Partial   Medication Last Dose Status   albuterol 108 (90 Base) MCG/ACT Aero Soln inhalation aerosol  Active   permethrin (ELIMITE) 5 % Cream  Active                ALLERGIES  Allergies   Allergen Reactions   • Ibuprofen        PHYSICAL EXAM  VITAL SIGNS: /63   Pulse 85   Temp 37 °C (98.6 °F) (Temporal)   Resp 14   Ht 1.6 m (5' 3\")   Wt 99.7 kg (219 lb 12.8 oz)   LMP 10/07/2019   SpO2 99%   BMI 38.94 kg/m²    Constitutional: Well developed, Well nourished, No acute distress, Non-toxic appearance.   HENT: Normocephalic, Atraumatic, Bilateral external ears normal, Oropharynx enlarged erythematous tonsils with exudates.  Petechia on the palate.  No asymmetric swelling to suggest abscess.  No signs of retropharyngeal abscess  Eyes: PERRL, EOMI, Conjunctiva normal, No discharge.   Neck: Normal range of motion, and to see pressure for lymphadenopathy no meningismus  Cardiovascular: Normal heart rate, Normal rhythm, No murmurs, No rubs, No gallops.   Thorax & Lungs: Normal breath sounds, No respiratory distress, No wheezing,  Abdomen: Bowel sounds normal, Soft, No tenderness,   Skin: Warm, Dry, No erythema, No rash.    Musculoskeletal: Good range of motion in all major joints.  Neurologic: Alert, No focal deficits noted.       Results for orders placed or performed during the hospital encounter of 10/31/19   Group A Strep by PCR "   Result Value Ref Range    Group A Strep by PCR DETECTED (A) Not Detected        COURSE & MEDICAL DECISION MAKING  Pertinent Labs & Imaging studies reviewed. (See chart for details)    Patient presents with sore throat and fever.  Clinically she has strep throat.  Strep swab was obtained per protocol this is positive.  We will give her a dose of antipyretic in the biotics here in the emergency department and the patient will be discharged home.  At the time of discharge she is afebrile and nontoxic.    To be discharged home with return precautions for strep throat.  She is encouraged to drink plenty fluids.  She is prescribed amoxicillin for 10 days.  She is to return for worsening sore throat cough fevers or other concerns.  Questions were answered, agreed with plan and discharged in good condition.     [address] [address]  [phone]  Schedule an appointment as soon as possible for a visit in 2 days        The patient was noted to have elevated blood pressure while in the ER and was counseled to see their doctor within one wee to have this rechecked.    FINAL IMPRESSION  1. Strep pharyngitis         2.   3.         Electronically signed by: Braulio Greene, 10/31/2019 9:36 AM

## 2019-10-31 NOTE — ED NOTES
from Lab called with critical result of + A strep at 0930. Critical lab result read back to .   Dr. Greene notified of critical lab result at 0930.  Critical lab result read back by Dr. Greene.

## 2019-10-31 NOTE — ED NOTES
Patient given Rx and work note, home with spouse. Patient understands discharge instruction. VSS. Ambulated out steadily with spouse.

## 2019-10-31 NOTE — DISCHARGE INSTRUCTIONS
Drink plenty of fluids.  Take Tylenol over-the-counter as directed for fever and pain.    Return for any new medical problems or complaints or if not improving in a couple of days.

## 2019-10-31 NOTE — ED TRIAGE NOTES
"Chief Complaint   Patient presents with   • Sore Throat     x 3 days     Pt to triage for above. Concerned for strep throat. Sent home from work today. Tonsils red and enlarged.     /81   Pulse 99   Temp 37 °C (98.6 °F) (Temporal)   Resp 14   Ht 1.6 m (5' 3\")   Wt 99.7 kg (219 lb 12.8 oz)   LMP 10/07/2019   SpO2 99%   BMI 38.94 kg/m²     "

## 2019-10-31 NOTE — ED NOTES
Patient has had a sore throat x 2 days, with painful swallowing. No s/sx of distress, respirations even and unlabored, airway patent.